# Patient Record
Sex: FEMALE
[De-identification: names, ages, dates, MRNs, and addresses within clinical notes are randomized per-mention and may not be internally consistent; named-entity substitution may affect disease eponyms.]

---

## 2017-05-14 ENCOUNTER — HOSPITAL ENCOUNTER (EMERGENCY)
Dept: HOSPITAL 5 - ED | Age: 47
LOS: 1 days | Discharge: HOME | End: 2017-05-15
Payer: MEDICARE

## 2017-05-14 VITALS — SYSTOLIC BLOOD PRESSURE: 116 MMHG | DIASTOLIC BLOOD PRESSURE: 79 MMHG

## 2017-05-14 DIAGNOSIS — K21.9: ICD-10-CM

## 2017-05-14 DIAGNOSIS — I10: ICD-10-CM

## 2017-05-14 DIAGNOSIS — F31.9: ICD-10-CM

## 2017-05-14 DIAGNOSIS — G43.909: Primary | ICD-10-CM

## 2017-05-14 DIAGNOSIS — J01.00: ICD-10-CM

## 2017-05-14 DIAGNOSIS — F17.200: ICD-10-CM

## 2017-05-14 DIAGNOSIS — Z88.8: ICD-10-CM

## 2017-05-14 LAB
ANION GAP SERPL CALC-SCNC: 19 MMOL/L
BASOPHILS NFR BLD AUTO: 1 % (ref 0–1.8)
BILIRUB UR QL STRIP: (no result)
BLOOD UR QL VISUAL: (no result)
BUN SERPL-MCNC: 12 MG/DL (ref 7–17)
BUN/CREAT SERPL: 17.14 %
CALCIUM SERPL-MCNC: 8.8 MG/DL (ref 8.4–10.2)
CHLORIDE SERPL-SCNC: 99.4 MMOL/L (ref 98–107)
CO2 SERPL-SCNC: 21 MMOL/L (ref 22–30)
EOSINOPHIL NFR BLD AUTO: 5.1 % (ref 0–4.3)
GLUCOSE SERPL-MCNC: 100 MG/DL (ref 65–100)
HCT VFR BLD CALC: 35.7 % (ref 30.3–42.9)
HGB BLD-MCNC: 12 GM/DL (ref 10.1–14.3)
KETONES UR STRIP-MCNC: (no result) MG/DL
LEUKOCYTE ESTERASE UR QL STRIP: (no result)
MCH RBC QN AUTO: 29 PG (ref 28–32)
MCHC RBC AUTO-ENTMCNC: 34 % (ref 30–34)
MCV RBC AUTO: 87 FL (ref 79–97)
NITRITE UR QL STRIP: (no result)
PH UR STRIP: 7 [PH] (ref 5–7)
PLATELET # BLD: 228 K/MM3 (ref 140–440)
POTASSIUM SERPL-SCNC: 4.5 MMOL/L (ref 3.6–5)
PROT UR STRIP-MCNC: (no result) MG/DL
RBC # BLD AUTO: 4.09 M/MM3 (ref 3.65–5.03)
RBC #/AREA URNS HPF: < 1 /HPF (ref 0–6)
SODIUM SERPL-SCNC: 135 MMOL/L (ref 137–145)
UROBILINOGEN UR-MCNC: < 2 MG/DL (ref ?–2)
WBC # BLD AUTO: 10.3 K/MM3 (ref 4.5–11)
WBC #/AREA URNS HPF: 1 /HPF (ref 0–6)

## 2017-05-14 PROCEDURE — 99284 EMERGENCY DEPT VISIT MOD MDM: CPT

## 2017-05-14 PROCEDURE — 81001 URINALYSIS AUTO W/SCOPE: CPT

## 2017-05-14 PROCEDURE — 96374 THER/PROPH/DIAG INJ IV PUSH: CPT

## 2017-05-14 PROCEDURE — 96375 TX/PRO/DX INJ NEW DRUG ADDON: CPT

## 2017-05-14 PROCEDURE — 80048 BASIC METABOLIC PNL TOTAL CA: CPT

## 2017-05-14 PROCEDURE — 96367 TX/PROPH/DG ADDL SEQ IV INF: CPT

## 2017-05-14 PROCEDURE — 81025 URINE PREGNANCY TEST: CPT

## 2017-05-14 PROCEDURE — 36415 COLL VENOUS BLD VENIPUNCTURE: CPT

## 2017-05-14 PROCEDURE — 96361 HYDRATE IV INFUSION ADD-ON: CPT

## 2017-05-14 PROCEDURE — 85025 COMPLETE CBC W/AUTO DIFF WBC: CPT

## 2017-05-14 PROCEDURE — 70450 CT HEAD/BRAIN W/O DYE: CPT

## 2017-05-14 NOTE — CAT SCAN REPORT
FINAL REPORT



PROCEDURE:  CT HEAD/BRAIN WO CON



TECHNIQUE:  Computerized tomography of the head was performed

without contrast material. 



HISTORY:  headache 



COMPARISON:  No prior studies are available for comparison.



FINDINGS:  

Skull and scalp: Normal.



Paranasal sinuses: An air-fluid level.



Ventricles and subarachnoid spaces: Normal.



Cerebrum: No evidence of hemorrhage, acute infarction or mass .



Cerebellum and brainstem: Is noted in the left maxillary sinus.



Vasculature: Normal.



Comments: None.



IMPRESSION:  

No acute intracranial abnormality



Acute left maxillary sinusitis

## 2017-05-14 NOTE — EMERGENCY DEPARTMENT REPORT
ED Headache HPI





- General


Chief Complaint: Headache


Stated Complaint: MIGRAINE/NAUSEA/EMESIS


Time Seen by Provider: 05/14/17 21:56





- History of Present Illness


Initial Comments: 





46-year-old female with history of hypertension and bipolar affective disorder 

currently under control presenting today because of headache.  Patient states 

that she has a history of migraines and has seen a neurologist but the last 

time was approximately 10 years ago when she also had an MRI that showed venous 

angioma, no intervention was recommended.  States that this time her headache 

is similar to her prior migraines and started yesterday night with occipital 

headache bilaterally that radiates to bifrontal area, associated with nausea 

and vomiting, no numbness/weakness, difficulty walking or talking.  This is 

associated with photophobia.  No fevers or chills.  Tried ibuprofen with mild 

relief.


Allergies/Adverse Reactions: 


Allergies





baclofen Allergy (Verified 05/14/17 19:01)


 Unknown


hydromorphone HCl [From Dilaudid] Allergy (Verified 05/14/17 19:01)


 Unknown


prochlorperazine [From Compazine] Allergy (Verified 05/14/17 19:01)


 Unknown


prochlorperazine edisylate [From Compazine] Allergy (Verified 05/14/17 19:01)


 Unknown


prochlorperazine maleate [From Compazine] Allergy (Verified 05/14/17 19:01)


 Unknown


sulfamethoxazole [From Bactrim] Allergy (Verified 05/14/17 19:01)


 Unknown


trimethoprim [From Bactrim] Allergy (Verified 05/14/17 19:01)


 Unknown








Home Medications: 


Ambulatory Orders





Butalb/Acetamin/Caff -40 [Fioricet] 1 tab PO Q6HR PRN #10 tab 05/15/17 


Ibuprofen [Motrin] 600 mg PO Q8H PRN #14 tablet 05/15/17 


Oxymetazoline 0.05% [Afrin] 2 spray NS BID #1 bottle 05/15/17 











ED Review of Systems


ROS: 


Stated complaint: MIGRAINE/NAUSEA/EMESIS


Other details as noted in HPI





Comment: All other systems reviewed and negative


Constitutional: denies: chills, fever


Respiratory: denies: cough


Cardiovascular: denies: chest pain


Gastrointestinal: nausea.  denies: abdominal pain


Skin: denies: rash


Neurological: headache.  denies: weakness, numbness, paresthesias, confusion, 

abnormal gait, vertigo


Psychiatric: denies: anxiety





ED Past Medical Hx





- Past Medical History


Hx Hypertension: Yes


Hx GERD: Yes


Hx Headaches / Migraines: Yes (MIGRAINES)


Hx Psychiatric Treatment: Yes (bipolar)


Additional medical history: gerd





- Surgical History


Additional Surgical History: RIGHT ACL X 4.  RIGHT WRIST.  LEFT KNEE.  LEFT 

ANKLE.  BACK SURGERY.  HYSTERECTOMY





- Social History


Smoking Status: Current Every Day Smoker


Substance Use Type: Prescribed





- Medications


Home Medications: 


 Home Medications











 Medication  Instructions  Recorded  Confirmed  Last Taken  Type


 


Butalb/Acetamin/Caff -40 1 tab PO Q6HR PRN #10 tab 05/15/17  Unknown Rx





[Fioricet]     


 


Ibuprofen [Motrin] 600 mg PO Q8H PRN #14 tablet 05/15/17  Unknown Rx


 


Oxymetazoline 0.05% [Afrin] 2 spray NS BID #1 bottle 05/15/17  Unknown Rx














ED Physical Exam





- General


Limitations: No Limitations


General appearance: alert





- Head


Head exam: Present: atraumatic





- Eye


Eye exam: Present: normal appearance, PERRL





- ENT


ENT exam: Present: normal exam





- Respiratory


Respiratory exam: Present: normal lung sounds bilaterally.  Absent: respiratory 

distress





- Cardiovascular


Cardiovascular Exam: Present: regular rate, normal rhythm





- GI/Abdominal


GI/Abdominal exam: Present: soft.  Absent: distended, tenderness





- Extremities Exam


Extremities exam: Present: normal inspection





- Neurological Exam


Neurological exam: Present: alert, oriented X3, CN II-XII intact, normal gait.  

Absent: motor sensory deficit





- Psychiatric


Psychiatric exam: Present: normal affect





- Skin


Skin exam: Present: intact





ED Course


 Vital Signs











  05/14/17 05/14/17 05/14/17





  19:04 20:57 20:59


 


Temperature 98.3 F 98 F 


 


Pulse Rate 86 70 


 


Respiratory 17 18 18





Rate   


 


Blood Pressure 130/71  


 


Blood Pressure  116/79 





[Right]   


 


O2 Sat by Pulse 98 98 98





Oximetry   














- Reevaluation(s)


Reevaluation #1: 





05/15/17 00:47


Labs are unremarkable, CT abdomen unremarkable, patient reassessed and symptoms 

have significantly improved.





ED Medical Decision Making





- Lab Data


Result diagrams: 


 05/14/17 21:28





 05/14/17 21:28





- Medical Decision Making





IV, labs preordered


IVF, fioricet, steroids, reglan/benadryl


CT head due to last imaging in remote past with venous angioma seen


CT shows acute maxillary sinusitis, does not show any evidence of acute 

intracranial pathology


Patient's symptoms significantly improved after medication, stable for 

discharge.


Critical care attestation.: 


If time is entered above; I have spent that time in minutes in the direct care 

of this critically ill patient, excluding procedure time.








ED Disposition


Clinical Impression: 


 Maxillary sinusitis, acute





Migraine


Qualifiers:


 Migraine type: unspecified Status migrainosus presence: without status 

migrainosus Intractability: not intractable Qualified Code(s): G43.909 - 

Migraine, unspecified, not intractable, without status migrainosus





Disposition: DISCHARGED TO HOME OR SELFCARE


Is pt being admited?: No


Does the pt Need Aspirin: No


Condition: Stable


Instructions:  Migraine Headache (ED)


Prescriptions: 


Butalb/Acetamin/Caff -40 [Fioricet] 1 tab PO Q6HR PRN #10 tab


 PRN Reason: Headache


Ibuprofen [Motrin] 600 mg PO Q8H PRN #14 tablet


 PRN Reason: Pain


Oxymetazoline 0.05% [Afrin] 2 spray NS BID #1 bottle


Referrals: 


PRIMARY CARE,MD [Primary Care Provider] - 3-5 Days

## 2017-05-26 ENCOUNTER — HOSPITAL ENCOUNTER (EMERGENCY)
Dept: HOSPITAL 5 - ED | Age: 47
LOS: 1 days | Discharge: HOME | End: 2017-05-27
Payer: MEDICARE

## 2017-05-26 VITALS — SYSTOLIC BLOOD PRESSURE: 120 MMHG | DIASTOLIC BLOOD PRESSURE: 79 MMHG

## 2017-05-26 DIAGNOSIS — F31.9: ICD-10-CM

## 2017-05-26 DIAGNOSIS — I10: ICD-10-CM

## 2017-05-26 DIAGNOSIS — K21.9: ICD-10-CM

## 2017-05-26 DIAGNOSIS — G43.909: ICD-10-CM

## 2017-05-26 DIAGNOSIS — F17.200: ICD-10-CM

## 2017-05-26 DIAGNOSIS — K62.5: Primary | ICD-10-CM

## 2017-05-26 LAB
ANION GAP SERPL CALC-SCNC: 22 MMOL/L
BASOPHILS NFR BLD AUTO: 0.8 % (ref 0–1.8)
BILIRUB UR QL STRIP: (no result)
BLOOD UR QL VISUAL: (no result)
BUN SERPL-MCNC: 12 MG/DL (ref 7–17)
BUN/CREAT SERPL: 15 %
CALCIUM SERPL-MCNC: 8.6 MG/DL (ref 8.4–10.2)
CHLORIDE SERPL-SCNC: 100.4 MMOL/L (ref 98–107)
CO2 SERPL-SCNC: 18 MMOL/L (ref 22–30)
EOSINOPHIL NFR BLD AUTO: 4.8 % (ref 0–4.3)
GLUCOSE SERPL-MCNC: 93 MG/DL (ref 65–100)
HCT VFR BLD CALC: 34.7 % (ref 30.3–42.9)
HGB BLD-MCNC: 11.5 GM/DL (ref 10.1–14.3)
KETONES UR STRIP-MCNC: (no result) MG/DL
LEUKOCYTE ESTERASE UR QL STRIP: (no result)
MCH RBC QN AUTO: 29 PG (ref 28–32)
MCHC RBC AUTO-ENTMCNC: 33 % (ref 30–34)
MCV RBC AUTO: 86 FL (ref 79–97)
MUCOUS THREADS #/AREA URNS HPF: (no result) /HPF
NITRITE UR QL STRIP: (no result)
PH UR STRIP: 6 [PH] (ref 5–7)
PLATELET # BLD: 254 K/MM3 (ref 140–440)
POTASSIUM SERPL-SCNC: 4.3 MMOL/L (ref 3.6–5)
PROT UR STRIP-MCNC: (no result) MG/DL
RBC # BLD AUTO: 4.04 M/MM3 (ref 3.65–5.03)
RBC #/AREA URNS HPF: 1 /HPF (ref 0–6)
SODIUM SERPL-SCNC: 136 MMOL/L (ref 137–145)
UROBILINOGEN UR-MCNC: < 2 MG/DL (ref ?–2)
WBC # BLD AUTO: 10 K/MM3 (ref 4.5–11)
WBC #/AREA URNS HPF: 1 /HPF (ref 0–6)

## 2017-05-26 PROCEDURE — 99284 EMERGENCY DEPT VISIT MOD MDM: CPT

## 2017-05-26 PROCEDURE — 36415 COLL VENOUS BLD VENIPUNCTURE: CPT

## 2017-05-26 PROCEDURE — 82962 GLUCOSE BLOOD TEST: CPT

## 2017-05-26 PROCEDURE — 80048 BASIC METABOLIC PNL TOTAL CA: CPT

## 2017-05-26 PROCEDURE — 81001 URINALYSIS AUTO W/SCOPE: CPT

## 2017-05-26 PROCEDURE — 96361 HYDRATE IV INFUSION ADD-ON: CPT

## 2017-05-26 PROCEDURE — 96374 THER/PROPH/DIAG INJ IV PUSH: CPT

## 2017-05-26 PROCEDURE — 85025 COMPLETE CBC W/AUTO DIFF WBC: CPT

## 2017-05-26 PROCEDURE — 74177 CT ABD & PELVIS W/CONTRAST: CPT

## 2017-05-26 PROCEDURE — 96375 TX/PRO/DX INJ NEW DRUG ADDON: CPT

## 2017-05-26 PROCEDURE — 96372 THER/PROPH/DIAG INJ SC/IM: CPT

## 2017-05-26 NOTE — EMERGENCY DEPARTMENT REPORT
ED N/V/D HPI





- General


Chief complaint: Nausea/Vomiting/Diarrhea


Stated complaint: RECTAL BLEEDING


Time Seen by Provider: 05/26/17 21:16


Source: patient


Mode of arrival: Ambulatory


Limitations: No Limitations





- History of Present Illness


Initial comments: 





46-year-old female presents to the emergency department complaining of diarrhea 

and rectal bleeding.  Patient states she began having watery diarrhea 4 days 

ago.  Yesterday she began having small amounts of bright red blood when she 

would wipe after a bowel movement.  Today she began having worsening bleeding.  

She reports today the blood was dark red.  She reports nausea, but no vomiting.

  She also reports a cramping lower abdominal pain that began earlier today.  

This pain does not radiate.  There has been no fever.  There are no other 

complaints.


MD complaint: nausea, diarrhea, abdominal pain


-: Gradual, days(s) (4)


Description of Diarrhea: water, bloody


Associated Abdominal Pain: Yes


Location: LLQ, RLQ


Radiation: none


Severity: mild


Pain Scale: 3


Quality: cramping


Consistency: constant


Improves with: none


Worsens with: none


Associated Symptoms: denies other symptoms





- Related Data


 Previous Rx's











 Medication  Instructions  Recorded  Last Taken  Type


 


Butalb/Acetamin/Caff -40 1 tab PO Q6HR PRN #10 tab 05/15/17 Unknown Rx





[Fioricet]    


 


Ibuprofen [Motrin] 600 mg PO Q8H PRN #14 tablet 05/15/17 Unknown Rx


 


Oxymetazoline 0.05% [Afrin] 2 spray NS BID #1 bottle 05/15/17 Unknown Rx


 


Diphenoxylate/Atropine [Lomotil] 1 tab PO Q4H PRN #14 tablet 05/27/17 Unknown Rx


 


HYDROcodone/APAP 5-325 [[place] 1 each PO Q6HR PRN #20 tablet 05/27/17 Unknown Rx





5/325]    











 Allergies











Allergy/AdvReac Type Severity Reaction Status Date / Time


 


baclofen Allergy  Unknown Verified 05/14/17 19:01


 


hydromorphone HCl Allergy  Unknown Verified 05/14/17 19:01





[From Dilaudid]     


 


prochlorperazine Allergy  Unknown Verified 05/14/17 19:01





[From Compazine]     


 


prochlorperazine edisylate Allergy  Unknown Verified 05/14/17 19:01





[From Compazine]     


 


prochlorperazine maleate Allergy  Unknown Verified 05/14/17 19:01





[From Compazine]     


 


sulfamethoxazole Allergy  Unknown Verified 05/14/17 19:01





[From Bactrim]     


 


trimethoprim [From Bactrim] Allergy  Unknown Verified 05/14/17 19:01














ED Review of Systems


ROS: 


Stated complaint: RECTAL BLEEDING


Other details as noted in HPI





Comment: All other systems reviewed and negative


Gastrointestinal: abdominal pain, nausea, diarrhea, hematochezia





ED Past Medical Hx





- Past Medical History


Previous Medical History?: Yes


Hx Hypertension: Yes


Hx GERD: Yes


Hx Headaches / Migraines: Yes (MIGRAINES)


Hx Psychiatric Treatment: Yes (bipolar)





- Surgical History


Past Surgical History?: Yes


Additional Surgical History: RIGHT ACL X 4.  RIGHT WRIST.  LEFT KNEE.  LEFT 

ANKLE.  BACK SURGERY.  HYSTERECTOMY





- Family History


Family history: no significant





- Social History


Smoking Status: Current Every Day Smoker


Substance Use Type: Prescribed





- Medications


Home Medications: 


 Home Medications











 Medication  Instructions  Recorded  Confirmed  Last Taken  Type


 


Butalb/Acetamin/Caff -40 1 tab PO Q6HR PRN #10 tab 05/15/17  Unknown Rx





[Fioricet]     


 


Ibuprofen [Motrin] 600 mg PO Q8H PRN #14 tablet 05/15/17  Unknown Rx


 


Oxymetazoline 0.05% [Afrin] 2 spray NS BID #1 bottle 05/15/17  Unknown Rx


 


Diphenoxylate/Atropine [Lomotil] 1 tab PO Q4H PRN #14 tablet 05/27/17  Unknown 

Rx


 


HYDROcodone/APAP 5-325 [[place] 1 each PO Q6HR PRN #20 tablet 05/27/17  Unknown Rx





5/325]     














ED Physical Exam





- General


Limitations: No Limitations


General appearance: alert, in no apparent distress





- Head


Head exam: Present: atraumatic, normocephalic





- Eye


Eye exam: Present: normal appearance, PERRL, EOMI





- ENT


ENT exam: Present: normal exam, normal orophraynx, mucous membranes moist





- Neck


Neck exam: Present: normal inspection, full ROM.  Absent: tenderness





- Respiratory


Respiratory exam: Present: normal lung sounds bilaterally.  Absent: respiratory 

distress





- Cardiovascular


Cardiovascular Exam: Present: regular rate, normal rhythm, normal heart sounds





- GI/Abdominal


GI/Abdominal exam: Present: soft, tenderness (mild tenderness to palpation to 

the left lower quadrant, right lower quadrant, and suprapubic regions.), normal 

bowel sounds.  Absent: distended, guarding, rebound





- Extremities Exam


Extremities exam: Present: normal inspection, full ROM.  Absent: tenderness





- Back Exam


Back exam: Present: normal inspection, full ROM.  Absent: tenderness





- Neurological Exam


Neurological exam: Present: alert, oriented X3.  Absent: motor sensory deficit





- Skin


Skin exam: Present: warm, dry, intact





ED Course


 Vital Signs











  05/26/17 05/26/17 05/26/17





  13:39 18:58 21:26


 


Temperature 99.5 F 98.3 F 


 


Pulse Rate 72 67 


 


Respiratory 19 18 20





Rate   


 


Blood Pressure 124/59 120/79 


 


O2 Sat by Pulse 100 100 97





Oximetry   














ED Medical Decision Making





- Lab Data


Result diagrams: 


 05/26/17 13:46





 05/26/17 13:46





- Radiology Data


Radiology results: report reviewed, image reviewed





CT abdomen and pelvis shows no acute intra-abdominal abnormality.





- Medical Decision Making





Lab and imaging results reviewed and discussed with the patient.  Patient 

reports feeling better with medication.  Patient will be discharged home at 

this time.





- Differential Diagnosis


diverticulitis, diverticulosis, hemorrhoids


Critical care attestation.: 


If time is entered above; I have spent that time in minutes in the direct care 

of this critically ill patient, excluding procedure time.








ED Disposition


Clinical Impression: 


 Rectal bleeding





Disposition: DISCHARGED TO HOME OR SELFCARE


Is pt being admited?: No


Condition: Stable


Instructions:  Rectal Bleeding (ED)


Prescriptions: 


Diphenoxylate/Atropine [Lomotil] 1 tab PO Q4H PRN #14 tablet


 PRN Reason: Diarrhea


HYDROcodone/APAP 5-325 [Norco 5/325] 1 each PO Q6HR PRN #20 tablet


 PRN Reason: Pain


Referrals: 


DR NEYDA [Other] - 3-5 Days


Time of Disposition: 01:33

## 2018-03-09 ENCOUNTER — HOSPITAL ENCOUNTER (EMERGENCY)
Dept: HOSPITAL 5 - ED | Age: 48
Discharge: HOME | End: 2018-03-09
Payer: MEDICARE

## 2018-03-09 VITALS — DIASTOLIC BLOOD PRESSURE: 76 MMHG | SYSTOLIC BLOOD PRESSURE: 118 MMHG

## 2018-03-09 DIAGNOSIS — K21.9: ICD-10-CM

## 2018-03-09 DIAGNOSIS — Z88.8: ICD-10-CM

## 2018-03-09 DIAGNOSIS — Z90.710: ICD-10-CM

## 2018-03-09 DIAGNOSIS — F31.9: ICD-10-CM

## 2018-03-09 DIAGNOSIS — I10: ICD-10-CM

## 2018-03-09 DIAGNOSIS — Z76.5: ICD-10-CM

## 2018-03-09 DIAGNOSIS — G43.919: Primary | ICD-10-CM

## 2018-03-09 DIAGNOSIS — Z88.1: ICD-10-CM

## 2018-03-09 DIAGNOSIS — F17.200: ICD-10-CM

## 2018-03-09 PROCEDURE — 96374 THER/PROPH/DIAG INJ IV PUSH: CPT

## 2018-03-09 PROCEDURE — 96375 TX/PRO/DX INJ NEW DRUG ADDON: CPT

## 2018-03-09 PROCEDURE — 99282 EMERGENCY DEPT VISIT SF MDM: CPT

## 2018-03-09 NOTE — EMERGENCY DEPARTMENT REPORT
ED Headache HPI





- General


Chief Complaint: Headache


Stated Complaint: MIGRAINE


Time Seen by Provider: 03/09/18 21:27





- History of Present Illness


Initial Comments: 





47-year-old  female comes in complaint of headache and nausea.  

Patient reports that she has a history of migraines.  Patient reports she's 

taken her Imitrex and a 5 mg Percocet without much relief.  Patient admits to 

nausea and vomited 2.  She missed a photophobia and loud noise X her head hurt 

worse.  Patient does elicit she has an appointment on March 27 with her 

neurologist.  Patient reports she has a past medical history of migraines and 

hypertension.  She has multiple allergies to meds.


Timing/Duration: 4-6 hours


Quality: moderate


Recent Head Trauma: no recent headache/trauma


Modifying Factors: improves with: exposure to light


Associated Symptoms: nausea/vomiting


Allergies/Adverse Reactions: 


Allergies





baclofen Allergy (Verified 05/14/17 19:01)


 Unknown


hydromorphone HCl [From Dilaudid] Allergy (Verified 05/14/17 19:01)


 Unknown


prochlorperazine [From Compazine] Allergy (Verified 05/14/17 19:01)


 Unknown


prochlorperazine edisylate [From Compazine] Allergy (Verified 05/14/17 19:01)


 Unknown


prochlorperazine maleate [From Compazine] Allergy (Verified 05/14/17 19:01)


 Unknown


sulfamethoxazole [From Bactrim] Allergy (Verified 05/14/17 19:01)


 Unknown


trimethoprim [From Bactrim] Allergy (Verified 05/14/17 19:01)


 Unknown








Home Medications: 


Ambulatory Orders





Butalb/Acetamin/Caff -40 [Fioricet] 1 tab PO Q6HR PRN #10 tab 05/15/17 


Ibuprofen [Motrin] 600 mg PO Q8H PRN #14 tablet 05/15/17 


Oxymetazoline 0.05% [Afrin] 2 spray NS BID #1 bottle 05/15/17 


Diphenoxylate/Atropine [Lomotil] 1 tab PO Q4H PRN #14 tablet 05/27/17 


HYDROcodone/APAP 5-325 [[place] 5/325] 1 each PO Q6HR PRN #20 tablet 05/27/17 











ED Review of Systems


ROS: 


Stated complaint: MIGRAINE


Other details as noted in HPI





Constitutional: denies: chills, fever


Eyes: denies: eye pain, eye discharge, vision change


ENT: denies: ear pain, throat pain


Respiratory: denies: cough, shortness of breath, wheezing


Cardiovascular: denies: chest pain, palpitations


Endocrine: no symptoms reported


Gastrointestinal: nausea, vomiting (X2)


Genitourinary: denies: urgency, dysuria, discharge


Musculoskeletal: denies: back pain, joint swelling, arthralgia


Skin: denies: rash, lesions


Neurological: headache.  denies: weakness, numbness, confusion, abnormal gait


Psychiatric: denies: anxiety, depression


Hematological/Lymphatic: denies: easy bleeding, easy bruising





ED Past Medical Hx





- Past Medical History


Hx Hypertension: Yes


Hx GERD: Yes


Hx Headaches / Migraines: Yes (MIGRAINES)


Hx Psychiatric Treatment: Yes (bipolar)





- Surgical History


Additional Surgical History: RIGHT ACL X 4.  RIGHT WRIST.  LEFT KNEE.  LEFT 

ANKLE.  BACK SURGERY.  HYSTERECTOMY





- Social History


Smoking Status: Current Every Day Smoker


Substance Use Type: None





- Medications


Home Medications: 


 Home Medications











 Medication  Instructions  Recorded  Confirmed  Last Taken  Type


 


Butalb/Acetamin/Caff -40 1 tab PO Q6HR PRN #10 tab 05/15/17  Unknown Rx





[Fioricet]     


 


Ibuprofen [Motrin] 600 mg PO Q8H PRN #14 tablet 05/15/17  Unknown Rx


 


Oxymetazoline 0.05% [Afrin] 2 spray NS BID #1 bottle 05/15/17  Unknown Rx


 


Diphenoxylate/Atropine [Lomotil] 1 tab PO Q4H PRN #14 tablet 05/27/17  Unknown 

Rx


 


HYDROcodone/APAP 5-325 [[place] 1 each PO Q6HR PRN #20 tablet 05/27/17  Unknown Rx





5/325]     














ED Physical Exam





- General


Limitations: No Limitations


General appearance: alert, in no apparent distress





- Head


Head exam: Present: atraumatic, normocephalic





- Eye


Eye exam: Present: normal appearance





- ENT


ENT exam: Present: mucous membranes moist





- Neck


Neck exam: Present: normal inspection





- Respiratory


Respiratory exam: Present: normal lung sounds bilaterally.  Absent: respiratory 

distress





- Cardiovascular


Cardiovascular Exam: Present: regular rate, normal rhythm.  Absent: systolic 

murmur, diastolic murmur, rubs, gallop





- GI/Abdominal


GI/Abdominal exam: Present: soft, normal bowel sounds





- Extremities Exam


Extremities exam: Present: normal inspection





- Back Exam


Back exam: Present: normal inspection





- Neurological Exam


Neurological exam: Present: alert, oriented X3





- Expanded Neurological Exam


  ** Expanded


Cranial nerves: EOM's Intact: Normal, Gag Reflex: Normal, Tongue Deviation: 

Normal, Nystagmus: Normal, Facial Sensation: Normal, Facial Palsy with Forehead 

Movement: Normal, Facial Palsy without Forehead Movement: Normal


Cerebellar function: Finger to Nose: Normal, Heel to Shin: Normal, Romberg: 

Normal


Upper motor neuron: [place] Neglect: Normal, Pronator Drift: Normal


Sensory exam: Upper Extremity Light Touch: Normal, Upper Extremity Pin Prick: 

Normal, Upper Extremity Temperature: Normal, UE 2 Point Discrimination: Normal, 

Lower Extremity Light Touch: Normal, Lower Extremity Pin Prick: Normal, Lower 

Extremity Temperature: Normal, LE 2 Point Discrimination: Normal


Motor strength exam: RUE: 4, LUE: 4, RLE: 4, LLE: 4


Best Eye Response ([place]): (4) open spontaneously


Best Motor Response ([place]): (6) obeys commands


Best Verbal Response ([place]): (5) oriented


[place] Total: 15





- Psychiatric


Psychiatric exam: Present: normal affect, normal mood





- Skin


Skin exam: Present: warm, dry, intact, normal color.  Absent: rash





ED Course


 Vital Signs











  03/09/18 03/09/18 03/09/18





  18:42 22:03 22:33


 


Temperature 98.6 F  


 


Pulse Rate 68  


 


Respiratory  16 16





Rate   


 


Blood Pressure 115/77  


 


O2 Sat by Pulse 100  





Oximetry   














ED Medical Decision Making





- Medical Decision Making





Patient has been evaluated by this provider fast track.  I discussed the 

patient we'll place an IV will give her Reglan Benadryl Toradol to help with 

her headaches.  Discussed the patient she needs to keep her appointment with 

her neurologist as scheduled for March 27.  Patient verbalized understanding.








Patient has had 2 prescriptions for narcotic pain medication on March 2 and 

March 4.  In February she's had 4 different prescriptions for narcotics.  She 

has multiple areas where she goes and gets her narcotics prescriptions such [facility] 

[facility]





I discussed the patient can give her Tylenol for her headache.  She's had 

Toradol Benadryl and Reglan IV.





Critical care attestation.: 


If time is entered above; I have spent that time in minutes in the direct care 

of this critically ill patient, excluding procedure time.








ED Disposition


Clinical Impression: 


 Drug-seeking behavior





Migraine


Qualifiers:


 Migraine type: unspecified Status migrainosus presence: without status 

migrainosus Intractability: intractable Qualified Code(s): G43.919 - Migraine, 

unspecified, intractable, without status migrainosus





Disposition: DC-01 TO HOME OR SELFCARE


Is pt being admited?: No


Does the pt Need Aspirin: No


Condition: Stable


Instructions:  Migraine Headache (ED)


Additional Instructions: 


Please continue taking her Imitrex as prescribed by her neurologist.  Please 

keep your appointment for March 27 with her neurologist.


Referrals: 


TOBI FAYE MD [Primary Care Provider] - 3-5 Days


Forms:  Work/School Release Form(ED)

## 2018-03-14 ENCOUNTER — HOSPITAL ENCOUNTER (EMERGENCY)
Dept: HOSPITAL 5 - ED | Age: 48
LOS: 1 days | Discharge: HOME | End: 2018-03-15
Payer: MEDICARE

## 2018-03-14 DIAGNOSIS — G43.919: Primary | ICD-10-CM

## 2018-03-14 DIAGNOSIS — F31.9: ICD-10-CM

## 2018-03-14 DIAGNOSIS — I10: ICD-10-CM

## 2018-03-14 DIAGNOSIS — K21.9: ICD-10-CM

## 2018-03-14 DIAGNOSIS — F17.200: ICD-10-CM

## 2018-03-14 PROCEDURE — 96374 THER/PROPH/DIAG INJ IV PUSH: CPT

## 2018-03-14 PROCEDURE — 99282 EMERGENCY DEPT VISIT SF MDM: CPT

## 2018-03-14 PROCEDURE — 96375 TX/PRO/DX INJ NEW DRUG ADDON: CPT

## 2018-03-15 VITALS — SYSTOLIC BLOOD PRESSURE: 137 MMHG | DIASTOLIC BLOOD PRESSURE: 85 MMHG

## 2018-03-15 NOTE — EMERGENCY DEPARTMENT REPORT
ED Headache HPI





- General


Chief Complaint: Headache


Stated Complaint: MIGRAINE; N/V


Time Seen by Provider: 03/15/18 05:17


Source: patient





- History of Present Illness


Initial Comments: 





47-year-old  female comes in reporting she has migraine with nausea 

and vomiting that started this morning.  Patient reports that she last vomited 

2 hours ago she took her Imitrex and ibuprofen and Tylenol 3 did not help.  She 

does not elicit this is a worse headache of her life.  Complains of light 

sensitivity she reports is her typical migraine.  She reports nausea and 

vomiting.  Patient was last seen here on the ninth with me for migraines as 

well.


Timing/Duration: 4-6 hours


Quality: moderate, throbbing


Recent Head Trauma: no recent headache/trauma


Associated Symptoms: nausea/vomiting.  denies: fatigue, fever/chills, loss of 

consciousness, nasal congestion, nasal drainage


Allergies/Adverse Reactions: 


Allergies





baclofen Allergy (Verified 05/14/17 19:01)


 Unknown


hydromorphone HCl [From Dilaudid] Allergy (Verified 05/14/17 19:01)


 Unknown


prochlorperazine [From Compazine] Allergy (Verified 05/14/17 19:01)


 Unknown


prochlorperazine edisylate [From Compazine] Allergy (Verified 05/14/17 19:01)


 Unknown


prochlorperazine maleate [From Compazine] Allergy (Verified 05/14/17 19:01)


 Unknown


sulfamethoxazole [From Bactrim] Allergy (Verified 05/14/17 19:01)


 Unknown


trimethoprim [From Bactrim] Allergy (Verified 05/14/17 19:01)


 Unknown








Home Medications: 


Ambulatory Orders





Butalb/Acetamin/Caff -40 [Fioricet] 1 tab PO Q6HR PRN #10 tab 05/15/17 


Ibuprofen [Motrin] 600 mg PO Q8H PRN #14 tablet 05/15/17 


Oxymetazoline 0.05% [Afrin] 2 spray NS BID #1 bottle 05/15/17 


Diphenoxylate/Atropine [Lomotil] 1 tab PO Q4H PRN #14 tablet 05/27/17 


HYDROcodone/APAP 5-325 [[place] 5/325] 1 each PO Q6HR PRN #20 tablet 05/27/17 


Ibuprofen 800 mg PO Q8H PRN #30 tablet 03/15/18 











ED Review of Systems


ROS: 


Stated complaint: MIGRAINE; N/V


Other details as noted in HPI





Constitutional: denies: chills, fever


Eyes: denies: eye pain, eye discharge, vision change


ENT: denies: ear pain, throat pain


Respiratory: denies: cough, shortness of breath, wheezing


Cardiovascular: denies: chest pain, palpitations


Endocrine: no symptoms reported


Gastrointestinal: nausea, vomiting.  denies: abdominal pain, diarrhea


Genitourinary: denies: urgency, dysuria, discharge


Musculoskeletal: denies: back pain, joint swelling, arthralgia


Skin: denies: rash, lesions


Neurological: headache.  denies: weakness, paresthesias


Psychiatric: denies: anxiety, depression


Hematological/Lymphatic: denies: easy bleeding, easy bruising





ED Past Medical Hx





- Past Medical History


Previous Medical History?: Yes


Hx Hypertension: Yes


Hx GERD: Yes


Hx Headaches / Migraines: Yes (MIGRAINES)


Hx Psychiatric Treatment: Yes (bipolar)





- Surgical History


Past Surgical History?: Yes


Additional Surgical History: RIGHT ACL X 4.  RIGHT WRIST.  LEFT KNEE.  LEFT 

ANKLE.  BACK SURGERY.  HYSTERECTOMY





- Social History


Smoking Status: Current Every Day Smoker


Substance Use Type: None





- Medications


Home Medications: 


 Home Medications











 Medication  Instructions  Recorded  Confirmed  Last Taken  Type


 


Butalb/Acetamin/Caff -40 1 tab PO Q6HR PRN #10 tab 05/15/17  Unknown Rx





[Fioricet]     


 


Ibuprofen [Motrin] 600 mg PO Q8H PRN #14 tablet 05/15/17  Unknown Rx


 


Oxymetazoline 0.05% [Afrin] 2 spray NS BID #1 bottle 05/15/17  Unknown Rx


 


Diphenoxylate/Atropine [Lomotil] 1 tab PO Q4H PRN #14 tablet 05/27/17  Unknown 

Rx


 


HYDROcodone/APAP 5-325 [[place] 1 each PO Q6HR PRN #20 tablet 05/27/17  Unknown Rx





5/325]     


 


Ibuprofen 800 mg PO Q8H PRN #30 tablet 03/15/18  Unknown Rx














ED Physical Exam





- General


Limitations: No Limitations


General appearance: alert, in no apparent distress





- Head


Head exam: Present: atraumatic, normocephalic





- Eye


Eye exam: Present: EOMI, other (pupils are enlarged and slow to constrict)





- ENT


ENT exam: Present: mucous membranes moist





- Neck


Neck exam: Present: normal inspection





- Respiratory


Respiratory exam: Present: normal lung sounds bilaterally.  Absent: respiratory 

distress





- Cardiovascular


Cardiovascular Exam: Present: regular rate, normal rhythm.  Absent: systolic 

murmur, diastolic murmur, rubs, gallop





- GI/Abdominal


GI/Abdominal exam: Present: soft, normal bowel sounds





- Extremities Exam


Extremities exam: Present: normal inspection





- Back Exam


Back exam: Present: normal inspection





- Neurological Exam


Neurological exam: Present: alert, oriented X3





- Psychiatric


Psychiatric exam: Present: normal affect, normal mood





- Skin


Skin exam: Present: warm, dry, intact, normal color.  Absent: rash





ED Course


 Vital Signs











  03/14/18





  23:50


 


Temperature 98.5 F


 


Pulse Rate 66


 


Respiratory 18





Rate 


 


Blood Pressure 112/67


 


O2 Sat by Pulse 98





Oximetry 














ED Medical Decision Making





- Medical Decision Making





Patient has been evaluated by this provider fast track.  Patient will be given 

Toradol and Benadryl and Reglan for headache.  She'll be discharged for only 

Tylenol and/or ibuprofen.  Discussed the patient she has multiple narcotics 

[facility] [place] [facility]  She has had 56 prescriptions for narcotics in 

the last 2 years.  Patient had 14 prescribers.  She is using 3 different 

pharmacies.


Critical care attestation.: 


If time is entered above; I have spent that time in minutes in the direct care 

of this critically ill patient, excluding procedure time.








ED Disposition


Clinical Impression: 


 Drug-seeking behavior





Migraine


Qualifiers:


 Migraine type: unspecified Status migrainosus presence: without status 

migrainosus Intractability: intractable Qualified Code(s): G43.919 - Migraine, 

unspecified, intractable, without status migrainosus





Disposition: DC-01 TO HOME OR SELFCARE


Is pt being admited?: No


Does the pt Need Aspirin: No


Condition: Stable


Instructions:  Migraine Headache (ED)


Additional Instructions: 


Please take ibuprofen for your pain.  Please follow-up which her primary care 

provider as well as her neurologist.


Prescriptions: 


Ibuprofen 800 mg PO Q8H PRN #30 tablet


 PRN Reason: Pain


Referrals: 


LILY MCDOWELL MD [Primary Care Provider] - 3-5 Days

## 2018-03-16 ENCOUNTER — HOSPITAL ENCOUNTER (EMERGENCY)
Dept: HOSPITAL 5 - ED | Age: 48
LOS: 1 days | Discharge: HOME | End: 2018-03-17
Payer: MEDICARE

## 2018-03-16 VITALS — SYSTOLIC BLOOD PRESSURE: 104 MMHG | DIASTOLIC BLOOD PRESSURE: 62 MMHG

## 2018-03-16 DIAGNOSIS — K21.9: ICD-10-CM

## 2018-03-16 DIAGNOSIS — T78.1XXA: Primary | ICD-10-CM

## 2018-03-16 DIAGNOSIS — F17.200: ICD-10-CM

## 2018-03-16 DIAGNOSIS — F31.9: ICD-10-CM

## 2018-03-16 DIAGNOSIS — Z90.710: ICD-10-CM

## 2018-03-16 DIAGNOSIS — I10: ICD-10-CM

## 2018-03-16 DIAGNOSIS — Z91.018: ICD-10-CM

## 2018-03-16 PROCEDURE — 96372 THER/PROPH/DIAG INJ SC/IM: CPT

## 2018-03-16 PROCEDURE — 99282 EMERGENCY DEPT VISIT SF MDM: CPT

## 2018-03-17 NOTE — EMERGENCY DEPARTMENT REPORT
HPI





- General


Chief Complaint: Allergic Reaction


Time Seen by Provider: 03/17/18 03:14





- HPI


HPI: 





47-year-old  female comes in from anger stated about noon applied she 

consumed mushrooms at within mixed vegetables and a half an hour later she 

started having body and throat itching.  She reports she took a Benadryl one by 

mouth and itching continued so she came to here to be evaluated.





ED Past Medical Hx





- Past Medical History


Hx Hypertension: Yes


Hx GERD: Yes


Hx Headaches / Migraines: Yes (MIGRAINES)


Hx Psychiatric Treatment: Yes (bipolar)





- Surgical History


Additional Surgical History: RIGHT ACL X 4, hysterectomy.  RIGHT WRIST.  LEFT 

KNEE.  LEFT ANKLE.  BACK SURGERY.  HYSTERECTOMY





- Social History


Smoking Status: Current Every Day Smoker


Substance Use Type: None





- Medications


Home Medications: 


 Home Medications











 Medication  Instructions  Recorded  Confirmed  Last Taken  Type


 


Butalb/Acetamin/Caff -40 1 tab PO Q6HR PRN #10 tab 05/15/17  Unknown Rx





[Fioricet]     


 


Ibuprofen [Motrin] 600 mg PO Q8H PRN #14 tablet 05/15/17  Unknown Rx


 


Oxymetazoline 0.05% [Afrin] 2 spray NS BID #1 bottle 05/15/17  Unknown Rx


 


Diphenoxylate/Atropine [Lomotil] 1 tab PO Q4H PRN #14 tablet 05/27/17  Unknown 

Rx


 


HYDROcodone/APAP 5-325 [[place] 1 each PO Q6HR PRN #20 tablet 05/27/17  Unknown Rx





5/325]     


 


Ibuprofen 800 mg PO Q8H PRN #30 tablet 03/15/18  Unknown Rx














ED Review of Systems


ROS: 


Stated complaint: ALLERGIC REACTION


Other details as noted in HPI





Constitutional: denies: chills, fever


Eyes: denies: eye pain, eye discharge, vision change


ENT: denies: ear pain, throat pain


Respiratory: denies: cough, shortness of breath, wheezing


Cardiovascular: denies: chest pain, palpitations


Endocrine: no symptoms reported


Gastrointestinal: denies: abdominal pain, nausea, diarrhea


Genitourinary: denies: urgency, dysuria, discharge


Musculoskeletal: denies: back pain, joint swelling, arthralgia


Skin: pruritus.  denies: rash, lesions


Neurological: denies: headache, weakness, paresthesias


Psychiatric: denies: anxiety, depression


Hematological/Lymphatic: denies: easy bleeding, easy bruising





Physical Exam





- Physical Exam


Vital Signs: 


 Vital Signs











  03/16/18





  23:14


 


Temperature 98 F


 


Pulse Rate 76


 


Respiratory 18





Rate 


 


Blood Pressure 104/62


 


O2 Sat by Pulse 98





Oximetry 











General: 





Patient is alert and oriented 3.  No acute distress talking in full sentences 

following commands.


Physical Exam: 





GENERAL APPEARANCE: Well developed, well nourished, in no acute distress.


SKIN: Inspection of the skin reveals no rashes, ulcerations or petechiae.


HEENT: The sclerae were anicteric and conjunctivae were pink and moist. 

Extraocular movements were intact and pupils were equal, round, and reactive to 

light with normal accommodation. External inspection of the ears and nose 

showed no scars, lesions, or masses. Lips, teeth, and gums showed normal 

mucosa. The oral mucosa, hard and soft palate, tongue and posterior pharynx 

were normal.  No swelling to the mouth and tongue or throat.


NECK: Supple and symmetric. 


CHEST: Normal AP diameter and normal contour with  kyphoscoliosis.


LUNGS: Auscultation of the lungs revealed normal breath sounds without any 

other adventitious sounds or rubs.


CARDIOVASCULAR: There was a regular rate and rhythm without any murmurs, gallops

, rubs. T


prostate was normal size without any nodules appreciated (men only).


LYMPH NODES: No lymphadenopathy was appreciated in the neck, axillae or groin.


MUSCULOSKELETAL: Gait was normal. There was no tenderness or effusions noted. 

Muscle strength and tone were normal.


EXTREMITIES: No cyanosis, clubbing or edema.


NEUROLOGIC: Alert and oriented x 3. Normal affect. Gait was normal. . Sensation 

to touch was normal. 








ED Course


 Vital Signs











  03/16/18





  23:14


 


Temperature 98 F


 


Pulse Rate 76


 


Respiratory 18





Rate 


 


Blood Pressure 104/62


 


O2 Sat by Pulse 98





Oximetry 














ED Medical Decision Making





- Medical Decision Making





Patient has been evaluated by this provider in fast track.  This is my third 

time I have seen this patient in the last week.  Patient was given 50 mg of 

Benadryl 20 mg of Pepcid and 125 mg of Solu-Medrol.  Patient reports to me 

during my interview that she feels much better now.  We will discharge patient 

at this time she is in a stable.


Critical care attestation.: 


If time is entered above; I have spent that time in minutes in the direct care 

of this critically ill patient, excluding procedure time.








ED Disposition


Clinical Impression: 


Allergic reaction


Qualifiers:


 Encounter type: initial encounter Qualified Code(s): T78.40XA - Allergy, 

unspecified, initial encounter





Disposition: DC-01 TO HOME OR SELFCARE


Is pt being admited?: No


Does the pt Need Aspirin: No


Condition: Stable


Instructions:  Food Allergy (ED)


Additional Instructions: 


Please avoid offending agent such as mushrooms.  Follow-up with her primary 

care provider.


Referrals: 


PRIMARY CARE,MD [Primary Care Provider] - 3-5 Days

## 2018-03-18 ENCOUNTER — HOSPITAL ENCOUNTER (EMERGENCY)
Dept: HOSPITAL 5 - ED | Age: 48
LOS: 1 days | Discharge: PSYCHIATRIC OR SUBSTANCE USE FACILITY | End: 2018-03-19
Payer: MEDICARE

## 2018-03-18 DIAGNOSIS — F31.9: Primary | ICD-10-CM

## 2018-03-18 DIAGNOSIS — K21.9: ICD-10-CM

## 2018-03-18 DIAGNOSIS — Z90.710: ICD-10-CM

## 2018-03-18 DIAGNOSIS — Z88.8: ICD-10-CM

## 2018-03-18 DIAGNOSIS — G43.909: ICD-10-CM

## 2018-03-18 DIAGNOSIS — Z79.899: ICD-10-CM

## 2018-03-18 DIAGNOSIS — I10: ICD-10-CM

## 2018-03-18 DIAGNOSIS — F17.200: ICD-10-CM

## 2018-03-18 LAB
BACTERIA #/AREA URNS HPF: (no result) /HPF
BASOPHILS # (AUTO): 0.1 K/MM3 (ref 0–0.1)
BASOPHILS NFR BLD AUTO: 0.7 % (ref 0–1.8)
BENZODIAZEPINES SCREEN,URINE: (no result)
BILIRUB UR QL STRIP: (no result)
BLOOD UR QL VISUAL: (no result)
BUN SERPL-MCNC: 15 MG/DL (ref 7–17)
BUN/CREAT SERPL: 19 %
CALCIUM SERPL-MCNC: 8.2 MG/DL (ref 8.4–10.2)
EOSINOPHIL # BLD AUTO: 0.3 K/MM3 (ref 0–0.4)
EOSINOPHIL NFR BLD AUTO: 2.2 % (ref 0–4.3)
HCT VFR BLD CALC: 32.9 % (ref 30.3–42.9)
HEMOLYSIS INDEX: 72
HGB BLD-MCNC: 10.7 GM/DL (ref 10.1–14.3)
LYMPHOCYTES # BLD AUTO: 2.8 K/MM3 (ref 1.2–5.4)
LYMPHOCYTES NFR BLD AUTO: 22 % (ref 13.4–35)
MCH RBC QN AUTO: 29 PG (ref 28–32)
MCHC RBC AUTO-ENTMCNC: 32 % (ref 30–34)
MCV RBC AUTO: 91 FL (ref 79–97)
METHADONE SCREEN,URINE: (no result)
MONOCYTES # (AUTO): 0.9 K/MM3 (ref 0–0.8)
MONOCYTES % (AUTO): 7.2 % (ref 0–7.3)
MUCOUS THREADS #/AREA URNS HPF: (no result) /HPF
OPIATE SCREEN,URINE: (no result)
PH UR STRIP: 5 [PH] (ref 5–7)
PLATELET # BLD: 291 K/MM3 (ref 140–440)
PROT UR STRIP-MCNC: (no result) MG/DL
RBC # BLD AUTO: 3.62 M/MM3 (ref 3.65–5.03)
RBC #/AREA URNS HPF: 6 /HPF (ref 0–6)
UROBILINOGEN UR-MCNC: < 2 MG/DL (ref ?–2)
WBC #/AREA URNS HPF: 18 /HPF (ref 0–6)

## 2018-03-18 PROCEDURE — 85025 COMPLETE CBC W/AUTO DIFF WBC: CPT

## 2018-03-18 PROCEDURE — 81001 URINALYSIS AUTO W/SCOPE: CPT

## 2018-03-18 PROCEDURE — 99285 EMERGENCY DEPT VISIT HI MDM: CPT

## 2018-03-18 PROCEDURE — G0480 DRUG TEST DEF 1-7 CLASSES: HCPCS

## 2018-03-18 PROCEDURE — 80048 BASIC METABOLIC PNL TOTAL CA: CPT

## 2018-03-18 PROCEDURE — 80320 DRUG SCREEN QUANTALCOHOLS: CPT

## 2018-03-18 PROCEDURE — 36415 COLL VENOUS BLD VENIPUNCTURE: CPT

## 2018-03-18 PROCEDURE — 80307 DRUG TEST PRSMV CHEM ANLYZR: CPT

## 2018-03-18 NOTE — EMERGENCY DEPARTMENT REPORT
ED Psych HPI





- General


Chief Complaint: Psych


Stated Complaint: SUICIDAL THOUGHTS


Time Seen by Provider: 03/18/18 20:49


Source: patient


Mode of arrival: Ambulatory





- History of Present Illness


Initial Comments: 





Patient is 47 years old female history of hypertension, bipolar brought to the 

ER by her  for suicidal thoughts.  Patient told her  that she 

thinking about killing herself by shooting herself.  Patient had history of 

suicidal thoughts before.  She denied SI, HI.  She denied any auditory or 

visual hallucination.





- Related Data


 Previous Rx's











 Medication  Instructions  Recorded  Last Taken  Type


 


Butalb/Acetamin/Caff -40 1 tab PO Q6HR PRN #10 tab 05/15/17 Unknown Rx





[Fioricet]    


 


Ibuprofen [Motrin] 600 mg PO Q8H PRN #14 tablet 05/15/17 Unknown Rx


 


Oxymetazoline 0.05% [Afrin] 2 spray NS BID #1 bottle 05/15/17 Unknown Rx


 


Diphenoxylate/Atropine [Lomotil] 1 tab PO Q4H PRN #14 tablet 05/27/17 Unknown Rx


 


HYDROcodone/APAP 5-325 [[place] 1 each PO Q6HR PRN #20 tablet 05/27/17 Unknown Rx





5/325]    


 


Ibuprofen 800 mg PO Q8H PRN #30 tablet 03/15/18 Unknown Rx











 Allergies











Allergy/AdvReac Type Severity Reaction Status Date / Time


 


baclofen Allergy  Unknown Verified 05/14/17 19:01


 


hydromorphone HCl Allergy  Unknown Verified 05/14/17 19:01





[From Dilaudid]     


 


prochlorperazine Allergy  Unknown Verified 05/14/17 19:01





[From Compazine]     


 


prochlorperazine edisylate Allergy  Unknown Verified 05/14/17 19:01





[From Compazine]     


 


prochlorperazine maleate Allergy  Unknown Verified 05/14/17 19:01





[From Compazine]     


 


sulfamethoxazole Allergy  Unknown Verified 05/14/17 19:01





[From Bactrim]     


 


trimethoprim [From Bactrim] Allergy  Unknown Verified 05/14/17 19:01














ED Review of Systems


ROS: 


Stated complaint: SUICIDAL THOUGHTS


Other details as noted in HPI





Comment: All other systems reviewed and negative


Constitutional: denies: chills, fever


Respiratory: denies: cough, orthopnea, shortness of breath, SOB with exertion


Cardiovascular: denies: chest pain, palpitations


Gastrointestinal: denies: abdominal pain, nausea, vomiting


Genitourinary: denies: urgency, frequency, hematuria


Musculoskeletal: denies: back pain


Neurological: denies: headache, weakness, numbness, paresthesias


Psychiatric: depression, suicidal thoughts





ED Past Medical Hx





- Past Medical History


Previous Medical History?: Yes


Hx Hypertension: Yes


Hx GERD: Yes


Hx Headaches / Migraines: Yes (MIGRAINES)


Hx Psychiatric Treatment: Yes (bipolar, Suicidal attempt)





- Surgical History


Past Surgical History?: Yes


Additional Surgical History: RIGHT ACL X 4, hysterectomy.  RIGHT WRIST.  LEFT 

KNEE.  LEFT ANKLE.  BACK SURGERY.  HYSTERECTOMY





- Social History


Smoking Status: Current Every Day Smoker





- Medications


Home Medications: 


 Home Medications











 Medication  Instructions  Recorded  Confirmed  Last Taken  Type


 


Butalb/Acetamin/Caff -40 1 tab PO Q6HR PRN #10 tab 05/15/17  Unknown Rx





[Fioricet]     


 


Ibuprofen [Motrin] 600 mg PO Q8H PRN #14 tablet 05/15/17  Unknown Rx


 


Oxymetazoline 0.05% [Afrin] 2 spray NS BID #1 bottle 05/15/17  Unknown Rx


 


Diphenoxylate/Atropine [Lomotil] 1 tab PO Q4H PRN #14 tablet 05/27/17  Unknown 

Rx


 


HYDROcodone/APAP 5-325 [[place] 1 each PO Q6HR PRN #20 tablet 05/27/17  Unknown Rx





5/325]     


 


Ibuprofen 800 mg PO Q8H PRN #30 tablet 03/15/18  Unknown Rx














ED Physical Exam





- General


Limitations: No Limitations


General appearance: alert, in no apparent distress, anxious





- Head


Head exam: Present: atraumatic, normocephalic, normal inspection





- Eye


Eye exam: Present: normal appearance, PERRL





- ENT


ENT exam: Present: normal exam, normal orophraynx, mucous membranes moist





- Neck


Neck exam: Present: normal inspection, full ROM.  Absent: tenderness, 

meningismus, lymphadenopathy





- Respiratory


Respiratory exam: Present: normal lung sounds bilaterally.  Absent: respiratory 

distress, wheezes, rales, rhonchi, chest wall tenderness, accessory muscle use, 

decreased breath sounds, prolonged expiratory





- Cardiovascular


Cardiovascular Exam: Present: regular rate, normal rhythm, normal heart sounds





- GI/Abdominal


GI/Abdominal exam: Present: soft, normal bowel sounds.  Absent: distended, 

tenderness, guarding, rebound, rigid, organomegaly, mass, bruit, pulsatile mass





- Extremities Exam


Extremities exam: Present: normal inspection, full ROM, normal capillary refill





- Back Exam


Back exam: Present: normal inspection, full ROM.  Absent: CVA tenderness (R), 

CVA tenderness (L)





- Neurological Exam


Neurological exam: Present: alert, oriented X3, CN II-XII intact, normal gait





- Skin


Skin exam: Present: warm, intact, normal color





ED Course





 Vital Signs











  03/18/18





  18:23


 


Temperature 98.7 F


 


Pulse Rate 88


 


Respiratory 18





Rate 


 


Blood Pressure 123/76


 


O2 Sat by Pulse 98





Oximetry 














ED Medical Decision Making





- Lab Data


Result diagrams: 


 03/18/18 18:30





 03/18/18 18:30


Critical care attestation.: 


If time is entered above; I have spent that time in minutes in the direct care 

of this critically ill patient, excluding procedure time.








ED Disposition


Clinical Impression: 


 Suicidal thoughts





Disposition: DC/TX-65 PSY HOSP/PSY UNIT


Is pt being admited?: No


Condition: Stable


Referrals: 


TOBI FAYE MD [Primary Care Provider] - 3-5 Days

## 2018-03-19 VITALS — DIASTOLIC BLOOD PRESSURE: 70 MMHG | SYSTOLIC BLOOD PRESSURE: 113 MMHG

## 2018-03-19 NOTE — CONSULTATION
History of Present Illness





- Reason for Consult


Consult date: 03/19/18


Reason for consult: Mental Health Evaluation


Requesting physician: MICHEAL VICTOR





- Chief Complaint


Chief complaint: 


"I am suicidal"








- History of Present Psychiatric Illness


47 years old female with a history of hypertension brought to the ER by her 

 for SI's. Today the patient is calm, but vague during the assessment. 

She is adamant about being suicidal because of family issues. She would not 

confirm or deny a suicide plan when asked. She did state that she was a patient 

at [facility] recently. She denies HI's and AVH's. She denies recreational drug 

use and alcohol consumption (etoh). 








Medications and Allergies


 Allergies











Allergy/AdvReac Type Severity Reaction Status Date / Time


 


baclofen Allergy  Unknown Verified 05/14/17 19:01


 


hydromorphone HCl Allergy  Unknown Verified 05/14/17 19:01





[From Dilaudid]     


 


prochlorperazine Allergy  Unknown Verified 05/14/17 19:01





[From Compazine]     


 


prochlorperazine edisylate Allergy  Unknown Verified 05/14/17 19:01





[From Compazine]     


 


prochlorperazine maleate Allergy  Unknown Verified 05/14/17 19:01





[From Compazine]     


 


sulfamethoxazole Allergy  Unknown Verified 05/14/17 19:01





[From Bactrim]     


 


trimethoprim [From Bactrim] Allergy  Unknown Verified 05/14/17 19:01











 Home Medications











 Medication  Instructions  Recorded  Confirmed  Last Taken  Type


 


Butalb/Acetamin/Caff -40 1 tab PO Q6HR PRN #10 tab 05/15/17 03/19/18 

Unknown Rx





[Fioricet]     


 


Oxymetazoline 0.05% [Afrin] 2 spray NS BID #1 bottle 05/15/17 03/19/18 Unknown 

Rx


 


Diphenoxylate/Atropine [Lomotil] 1 tab PO Q4H PRN #14 tablet 05/27/17 03/19/18 

Unknown Rx


 


HYDROcodone/APAP 5-325 [[place] 1 each PO Q6HR PRN #20 tablet 05/27/17 03/19/18 

Unknown Rx





5/325]     


 


Ibuprofen 800 mg PO Q8H PRN #30 tablet 03/15/18 03/19/18 Unknown Rx


 


Gabapentin [Neurontin] 600 mg PO TID 03/19/18 03/19/18 Unknown History


 


QUEtiapine [SEROquel] 600 mg PO QHS 03/19/18 03/19/18 Unknown History


 


Quetiapine Fumarate [SEROquel] 50 mg PO BID 03/19/18 03/19/18 Unknown History


 


clonazePAM [KlonoPIN] 0.25 mg PO QAM 03/19/18 03/19/18 Unknown History


 


clonazePAM [KlonoPIN] 0.5 mg PO QHS 03/19/18 03/19/18 Unknown History














Past psychiatric history





- Past Medical History


Past Medical History: GERD, hypertension


Past Surgical History: No surgical history





- past Psychiatric treatment and history


Psych: Bipolar, Depression


psychiatric treatment history: 


Multiple inpatient psy settings. Denies a fam psy hx. 








- Social History


Social history: lives with family





Mental Status Exam





- Vital signs


 Last Vital Signs











Temp  97.8 F   03/18/18 22:17


 


Pulse  77   03/18/18 22:17


 


Resp  18   03/18/18 22:17


 


BP  113/70   03/18/18 23:46


 


Pulse Ox  100   03/18/18 23:46














- Exam


Narrative exam: 


MSE:





 Appearance: calm, cooperative


 Behavior: regular eye contact 


 Speech: regular rate and tone


 Mood: "depressed" 


 Affect: congruent to mood


 Thought Process: circumstantial  


 Thought Content: denies HI's and AVH's


 Motor Activity: sitting up in bed


 Cognition: A/O x3 


 Insight: variable 


 Judgment: variable 








Results


Result Diagrams: 


 03/18/18 18:30





 03/18/18 18:30


 Abnormal lab results











  03/18/18 03/18/18 03/18/18 Range/Units





  18:30 18:30 18:30 


 


WBC     (4.5-11.0)  K/mm3


 


RBC     (3.65-5.03)  M/mm3


 


Mono #     (0.0-0.8)  K/mm3


 


Seg Neutrophils #     (1.8-7.7)  K/mm3


 


Carbon Dioxide    19 L  (22-30)  mmol/L


 


Calcium    8.2 L  (8.4-10.2)  mg/dL


 


Urine WBC (Auto)     (0.0-6.0)  /HPF


 


Salicylates  < 0.3 L    (2.8-20.0)  mg/dL


 


Acetaminophen   < 5.0 L   (10.0-30.0)  ug/mL














  03/18/18 03/18/18 Range/Units





  18:30 18:49 


 


WBC  12.9 H   (4.5-11.0)  K/mm3


 


RBC  3.62 L   (3.65-5.03)  M/mm3


 


Mono #  0.9 H   (0.0-0.8)  K/mm3


 


Seg Neutrophils #  8.7 H   (1.8-7.7)  K/mm3


 


Carbon Dioxide    (22-30)  mmol/L


 


Calcium    (8.4-10.2)  mg/dL


 


Urine WBC (Auto)   18.0 H  (0.0-6.0)  /HPF


 


Salicylates    (2.8-20.0)  mg/dL


 


Acetaminophen    (10.0-30.0)  ug/mL








All other labs normal.








Assessment and Plan


Assessment and plan: 


Impression: Unspecified Mood DO. Today the patient is calm, but vague during 

the assessment. The positive for benzos and opiates.





DDx: R/O Bipolar DO, R/O MDD





Recommendation/Plan: Continue 1013 with placement to [facility] today.

## 2018-04-04 ENCOUNTER — HOSPITAL ENCOUNTER (EMERGENCY)
Dept: HOSPITAL 5 - ED | Age: 48
Discharge: HOME | End: 2018-04-04
Payer: MEDICARE

## 2018-04-04 VITALS — SYSTOLIC BLOOD PRESSURE: 165 MMHG | DIASTOLIC BLOOD PRESSURE: 95 MMHG

## 2018-04-04 DIAGNOSIS — N39.0: ICD-10-CM

## 2018-04-04 DIAGNOSIS — G43.909: ICD-10-CM

## 2018-04-04 DIAGNOSIS — I10: ICD-10-CM

## 2018-04-04 DIAGNOSIS — F17.200: ICD-10-CM

## 2018-04-04 DIAGNOSIS — Z88.8: ICD-10-CM

## 2018-04-04 DIAGNOSIS — F31.9: Primary | ICD-10-CM

## 2018-04-04 DIAGNOSIS — K21.9: ICD-10-CM

## 2018-04-04 DIAGNOSIS — Z88.2: ICD-10-CM

## 2018-04-04 LAB
BASOPHILS # (AUTO): 0 K/MM3 (ref 0–0.1)
BASOPHILS NFR BLD AUTO: 0.4 % (ref 0–1.8)
BENZODIAZEPINES SCREEN,URINE: (no result)
BILIRUB UR QL STRIP: (no result)
BLOOD UR QL VISUAL: (no result)
BUN SERPL-MCNC: 11 MG/DL (ref 7–17)
BUN/CREAT SERPL: 14 %
CALCIUM SERPL-MCNC: 9.2 MG/DL (ref 8.4–10.2)
EOSINOPHIL # BLD AUTO: 0.4 K/MM3 (ref 0–0.4)
EOSINOPHIL NFR BLD AUTO: 4.4 % (ref 0–4.3)
HCT VFR BLD CALC: 34.1 % (ref 30.3–42.9)
HEMOLYSIS INDEX: 6
HGB BLD-MCNC: 11.5 GM/DL (ref 10.1–14.3)
LYMPHOCYTES # BLD AUTO: 1.9 K/MM3 (ref 1.2–5.4)
LYMPHOCYTES NFR BLD AUTO: 24 % (ref 13.4–35)
MCH RBC QN AUTO: 30 PG (ref 28–32)
MCHC RBC AUTO-ENTMCNC: 34 % (ref 30–34)
MCV RBC AUTO: 88 FL (ref 79–97)
METHADONE SCREEN,URINE: (no result)
MONOCYTES # (AUTO): 0.5 K/MM3 (ref 0–0.8)
MONOCYTES % (AUTO): 5.8 % (ref 0–7.3)
MUCOUS THREADS #/AREA URNS HPF: (no result) /HPF
OPIATE SCREEN,URINE: (no result)
PH UR STRIP: 6 [PH] (ref 5–7)
PLATELET # BLD: 234 K/MM3 (ref 140–440)
PROT UR STRIP-MCNC: (no result) MG/DL
RBC # BLD AUTO: 3.88 M/MM3 (ref 3.65–5.03)
RBC #/AREA URNS HPF: 3 /HPF (ref 0–6)
UROBILINOGEN UR-MCNC: < 2 MG/DL (ref ?–2)
WBC #/AREA URNS HPF: 8 /HPF (ref 0–6)

## 2018-04-04 PROCEDURE — 85025 COMPLETE CBC W/AUTO DIFF WBC: CPT

## 2018-04-04 PROCEDURE — 80307 DRUG TEST PRSMV CHEM ANLYZR: CPT

## 2018-04-04 PROCEDURE — G0480 DRUG TEST DEF 1-7 CLASSES: HCPCS

## 2018-04-04 PROCEDURE — 80048 BASIC METABOLIC PNL TOTAL CA: CPT

## 2018-04-04 PROCEDURE — 80320 DRUG SCREEN QUANTALCOHOLS: CPT

## 2018-04-04 PROCEDURE — 81001 URINALYSIS AUTO W/SCOPE: CPT

## 2018-04-04 PROCEDURE — 36415 COLL VENOUS BLD VENIPUNCTURE: CPT

## 2018-04-04 PROCEDURE — 99283 EMERGENCY DEPT VISIT LOW MDM: CPT

## 2018-04-04 NOTE — EMERGENCY DEPARTMENT REPORT
ED Psych HPI





- General


Chief Complaint: Psych


Stated Complaint: MH


Time Seen by Provider: 04/04/18 16:34


Source: patient


Mode of arrival: Ambulatory





- History of Present Illness


Initial Comments: 





Patient is a 47-year-old  female states for the last several days she'

s had suicidal ideations.  Patient states she wants to shoot herself in the 

head with a gun.  There is a gun in the home.  Patient states she is very 

anxious as well.  Patient presented in May for further evaluation.  Patient is 

placed on 1013.


MD Complaint: suicidal ideation, feels depressed


Associated Psychiatric Symptoms: depression, suicidal ideation


Improves With: none


Worsens With: none


Associated Symptoms: insomnia.  denies: nausea, vomiting, syncope





- Related Data


 Home Medications











 Medication  Instructions  Recorded  Confirmed  Last Taken


 


Gabapentin [Neurontin] 300 mg PO TID 03/19/18 04/04/18 Unknown


 


QUEtiapine [SEROquel] 600 mg PO QHS 03/19/18 04/04/18 Unknown


 


Quetiapine Fumarate [SEROquel] 400 mg PO DAILY 03/19/18 04/04/18 Unknown


 


clonazePAM [KlonoPIN] 0.5 mg PO QHS 03/19/18 04/04/18 Unknown


 


SUMAtriptan SUCCINATE [Imitrex] 100 mg PO DAILY PRN 04/04/18 04/04/18 Unknown


 


busPIRone [Buspar] 40 mg PO DAILY 04/04/18 04/04/18 Unknown








 Previous Rx's











 Medication  Instructions  Recorded  Last Taken  Type


 


Butalb/Acetamin/Caff -40 1 tab PO Q6HR PRN #10 tab 05/15/17 Unknown Rx





[Fioricet]    


 


HYDROcodone/APAP 5-325 [[place] 1 each PO Q6HR PRN #20 tablet 05/27/17 Unknown Rx





5/325]    


 


Nitrofurantoin Monohyd/M-Cryst 100 mg PO BID #14 capsule 04/04/18 Unknown Rx





[Macrobid 100 mg Capsule]    











 Allergies











Allergy/AdvReac Type Severity Reaction Status Date / Time


 


baclofen Allergy  Unknown Verified 05/14/17 19:01


 


hydromorphone HCl Allergy  Unknown Verified 05/14/17 19:01





[From Dilaudid]     


 


prochlorperazine Allergy  Unknown Verified 05/14/17 19:01





[From Compazine]     


 


prochlorperazine edisylate Allergy  Unknown Verified 05/14/17 19:01





[From Compazine]     


 


prochlorperazine maleate Allergy  Unknown Verified 05/14/17 19:01





[From Compazine]     


 


sulfamethoxazole Allergy  Unknown Verified 05/14/17 19:01





[From Bactrim]     


 


trimethoprim [From Bactrim] Allergy  Unknown Verified 05/14/17 19:01














ED Review of Systems


ROS: 


Stated complaint: MH


Other details as noted in HPI





Comment: All other systems reviewed and negative





ED Past Medical Hx





- Past Medical History


Previous Medical History?: Yes


Hx Hypertension: Yes


Hx GERD: Yes


Hx Headaches / Migraines: Yes (MIGRAINES)


Hx Psychiatric Treatment: Yes (bipolar, Suicidal attempt)





- Surgical History


Past Surgical History?: Yes


Additional Surgical History: RIGHT ACL X 4, hysterectomy.  RIGHT WRIST.  LEFT 

KNEE.  LEFT ANKLE.  BACK SURGERY.  HYSTERECTOMY





- Social History


Smoking Status: Current Every Day Smoker





- Medications


Home Medications: 


 Home Medications











 Medication  Instructions  Recorded  Confirmed  Last Taken  Type


 


Butalb/Acetamin/Caff -40 1 tab PO Q6HR PRN #10 tab 05/15/17 04/04/18 

Unknown Rx





[Fioricet]     


 


HYDROcodone/APAP 5-325 [[place] 1 each PO Q6HR PRN #20 tablet 05/27/17 04/04/18 

Unknown Rx





5/325]     


 


Gabapentin [Neurontin] 300 mg PO TID 03/19/18 04/04/18 Unknown History


 


QUEtiapine [SEROquel] 600 mg PO QHS 03/19/18 04/04/18 Unknown History


 


Quetiapine Fumarate [SEROquel] 400 mg PO DAILY 03/19/18 04/04/18 Unknown History


 


clonazePAM [KlonoPIN] 0.5 mg PO QHS 03/19/18 04/04/18 Unknown History


 


Nitrofurantoin Monohyd/M-Cryst 100 mg PO BID #14 capsule 04/04/18  Unknown Rx





[Macrobid 100 mg Capsule]     


 


SUMAtriptan SUCCINATE [Imitrex] 100 mg PO DAILY PRN 04/04/18 04/04/18 Unknown 

History


 


busPIRone [Buspar] 40 mg PO DAILY 04/04/18 04/04/18 Unknown History














ED Physical Exam





- General


Limitations: No Limitations


General appearance: alert, in no apparent distress





- Head


Head exam: Present: atraumatic, normocephalic





- Eye


Eye exam: Present: normal appearance





- ENT


ENT exam: Present: mucous membranes moist





- Neck


Neck exam: Present: normal inspection





- Respiratory


Respiratory exam: Present: normal lung sounds bilaterally.  Absent: respiratory 

distress, wheezes, rales, rhonchi





- Cardiovascular


Cardiovascular Exam: Present: regular rate, normal rhythm.  Absent: systolic 

murmur, diastolic murmur, rubs, gallop





- GI/Abdominal


GI/Abdominal exam: Present: soft, normal bowel sounds.  Absent: distended, 

tenderness, guarding, rebound





- Extremities Exam


Extremities exam: Present: normal inspection





- Back Exam


Back exam: Present: normal inspection





- Neurological Exam


Neurological exam: Present: alert, oriented X3





- Psychiatric


Psychiatric exam: Present: normal affect, normal mood





- Skin


Skin exam: Present: warm, dry, intact, normal color.  Absent: rash





ED Course


 Vital Signs











  04/04/18





  12:29


 


Temperature 97.8 F


 


Pulse Rate 76


 


Respiratory 18





Rate 


 


Blood Pressure 165/95


 


O2 Sat by Pulse 98





Oximetry 














ED Medical Decision Making





- Lab Data


Result diagrams: 


 04/04/18 13:02





 04/04/18 13:02





- Medical Decision Making





Patient is medically clear for psych evaluation








Addendum: Patient was seen by mental health  and we have found out that 

the [facility].  Patient left the last day.  

[facility]  [facility] back.  Patient will be treated for UTI with 

Macrobid.


Critical care attestation.: 


If time is entered above; I have spent that time in minutes in the direct care 

of this critically ill patient, excluding procedure time.








ED Disposition


Clinical Impression: 


 Suicidal ideation





UTI (urinary tract infection)


Qualifiers:


 Urinary tract infection type: site unspecified Hematuria presence: without 

hematuria Qualified Code(s): N39.0 - Urinary tract infection, site not specified





Disposition: DC-01 TO HOME OR SELFCARE


Is pt being admited?: No


Does the pt Need Aspirin: No


Condition: Stable


Prescriptions: 


Nitrofurantoin Monohyd/M-Cryst [Macrobid 100 mg Capsule] 100 mg PO BID #14 

capsule


Referrals: 


TOBI FAYE MD [Primary Care Provider] - 3-5 Days

## 2018-04-04 NOTE — EMERGENCY DEPARTMENT REPORT
Blank Doc





- Documentation


Documentation: 





Patient is a 47-year-old  female states for the last several days she'

s had suicidal ideations.  Patient states she wants to shoot herself in the 

head with a gun.  There is a gun in the home.  Patient states she is very 

anxious as well.  Patient presented in May for further evaluation.  Patient is 

placed on 1013.

## 2018-04-05 ENCOUNTER — HOSPITAL ENCOUNTER (EMERGENCY)
Dept: HOSPITAL 5 - ED | Age: 48
Discharge: LEFT BEFORE DISCHARGE | End: 2018-04-05
Payer: MEDICARE

## 2018-04-05 VITALS — SYSTOLIC BLOOD PRESSURE: 129 MMHG | DIASTOLIC BLOOD PRESSURE: 69 MMHG

## 2018-04-05 DIAGNOSIS — Z53.21: ICD-10-CM

## 2018-04-05 DIAGNOSIS — R45.851: Primary | ICD-10-CM

## 2018-04-06 ENCOUNTER — HOSPITAL ENCOUNTER (EMERGENCY)
Dept: HOSPITAL 5 - ED | Age: 48
Discharge: HOME | End: 2018-04-06
Payer: MEDICARE

## 2018-04-06 VITALS — DIASTOLIC BLOOD PRESSURE: 78 MMHG | SYSTOLIC BLOOD PRESSURE: 126 MMHG

## 2018-04-06 DIAGNOSIS — F17.200: ICD-10-CM

## 2018-04-06 DIAGNOSIS — K21.9: ICD-10-CM

## 2018-04-06 DIAGNOSIS — I10: ICD-10-CM

## 2018-04-06 DIAGNOSIS — Z88.8: ICD-10-CM

## 2018-04-06 DIAGNOSIS — S73.102A: Primary | ICD-10-CM

## 2018-04-06 DIAGNOSIS — Z90.710: ICD-10-CM
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
## 2018-04-06 DIAGNOSIS — F31.9: ICD-10-CM

## 2018-04-06 DIAGNOSIS — Z88.2: ICD-10-CM
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
## 2018-04-06 LAB
ALBUMIN SERPL-MCNC: 4.3 G/DL (ref 3.9–5)
ALT SERPL-CCNC: 13 UNITS/L (ref 7–56)
BASOPHILS # (AUTO): 0.1 K/MM3 (ref 0–0.1)
BASOPHILS NFR BLD AUTO: 0.8 % (ref 0–1.8)
BUN SERPL-MCNC: 9 MG/DL (ref 7–17)
BUN/CREAT SERPL: 13 %
CALCIUM SERPL-MCNC: 9 MG/DL (ref 8.4–10.2)
EOSINOPHIL # BLD AUTO: 0.5 K/MM3 (ref 0–0.4)
EOSINOPHIL NFR BLD AUTO: 5.5 % (ref 0–4.3)
HCT VFR BLD CALC: 34.6 % (ref 30.3–42.9)
HEMOLYSIS INDEX: 37
HGB BLD-MCNC: 11.4 GM/DL (ref 10.1–14.3)
LYMPHOCYTES # BLD AUTO: 1.9 K/MM3 (ref 1.2–5.4)
LYMPHOCYTES NFR BLD AUTO: 20.3 % (ref 13.4–35)
MCH RBC QN AUTO: 29 PG (ref 28–32)
MCHC RBC AUTO-ENTMCNC: 33 % (ref 30–34)
MCV RBC AUTO: 89 FL (ref 79–97)
MONOCYTES # (AUTO): 0.7 K/MM3 (ref 0–0.8)
MONOCYTES % (AUTO): 7.6 % (ref 0–7.3)
PLATELET # BLD: 230 K/MM3 (ref 140–440)
RBC # BLD AUTO: 3.89 M/MM3 (ref 3.65–5.03)

## 2018-04-06 PROCEDURE — 85025 COMPLETE CBC W/AUTO DIFF WBC: CPT

## 2018-04-06 PROCEDURE — 36415 COLL VENOUS BLD VENIPUNCTURE: CPT

## 2018-04-06 PROCEDURE — 80053 COMPREHEN METABOLIC PANEL: CPT

## 2018-04-06 NOTE — EMERGENCY DEPARTMENT REPORT
ED Lower Extremity HPI





- General


Chief Complaint: Fall


Stated Complaint: LEFT HIP PAIN/FALL GLF


Time Seen by Provider: 04/06/18 19:13


Source: patient, EMS


Mode of arrival: Stretcher


Limitations: Physical Limitation





- History of Present Illness


MD Complaint: hip injury (left)


-: This afternoon


Injury: Hip: Left


Type of Injury: hyperextension


Severity: moderate


Severity scale (0 -10): 5


Improves With: nothing


Worsens With: movement


Context: fall


Associated Symptoms: snap/pop sensation.  denies: ambulatory





- Related Data


 Home Medications











 Medication  Instructions  Recorded  Confirmed  Last Taken


 


Gabapentin [Neurontin] 300 mg PO TID 03/19/18 04/04/18 Unknown


 


QUEtiapine [SEROquel] 600 mg PO QHS 03/19/18 04/04/18 Unknown


 


Quetiapine Fumarate [SEROquel] 400 mg PO DAILY 03/19/18 04/04/18 Unknown


 


clonazePAM [KlonoPIN] 0.5 mg PO QHS 03/19/18 04/04/18 Unknown


 


SUMAtriptan SUCCINATE [Imitrex] 100 mg PO DAILY PRN 04/04/18 04/04/18 Unknown


 


busPIRone [Buspar] 40 mg PO DAILY 04/04/18 04/04/18 Unknown








 Previous Rx's











 Medication  Instructions  Recorded  Last Taken  Type


 


Butalb/Acetamin/Caff -40 1 tab PO Q6HR PRN #10 tab 05/15/17 Unknown Rx





[Fioricet]    


 


Nitrofurantoin Monohyd/M-Cryst 100 mg PO BID #14 capsule 04/04/18 Unknown Rx





[Macrobid 100 mg Capsule]    


 


HYDROcodone/APAP 5-325 [[place] 1 each PO Q6HR PRN #14 tablet 04/06/18 Unknown Rx





5-325 mg TAB]    


 


Ibuprofen [Motrin] 600 mg PO Q8H PRN #20 tablet 04/06/18 Unknown Rx











 Allergies











Allergy/AdvReac Type Severity Reaction Status Date / Time


 


baclofen Allergy  Unknown Verified 05/14/17 19:01


 


hydromorphone HCl Allergy  Unknown Verified 05/14/17 19:01





[From Dilaudid]     


 


prochlorperazine Allergy  Unknown Verified 05/14/17 19:01





[From Compazine]     


 


prochlorperazine edisylate Allergy  Unknown Verified 05/14/17 19:01





[From Compazine]     


 


prochlorperazine maleate Allergy  Unknown Verified 05/14/17 19:01





[From Compazine]     


 


sulfamethoxazole Allergy  Unknown Verified 05/14/17 19:01





[From Bactrim]     


 


trimethoprim [From Bactrim] Allergy  Unknown Verified 05/14/17 19:01














ED Review of Systems


ROS: 


Stated complaint: LEFT HIP PAIN/FALL GLF


Other details as noted in HPI





Comment: All other systems reviewed and negative





ED Past Medical Hx





- Past Medical History


Previous Medical History?: Yes


Hx Hypertension: Yes


Hx GERD: Yes


Hx Headaches / Migraines: Yes (MIGRAINES)


Hx Psychiatric Treatment: Yes (bipolar, Suicidal attempt)





- Surgical History


Past Surgical History?: Yes


Additional Surgical History: RIGHT ACL X 4, hysterectomy.  RIGHT WRIST.  LEFT 

KNEE.  LEFT ANKLE.  BACK SURGERY.  HYSTERECTOMY





- Social History


Smoking Status: Current Every Day Smoker





- Medications


Home Medications: 


 Home Medications











 Medication  Instructions  Recorded  Confirmed  Last Taken  Type


 


Butalb/Acetamin/Caff -40 1 tab PO Q6HR PRN #10 tab 05/15/17 04/04/18 

Unknown Rx





[Fioricet]     


 


Gabapentin [Neurontin] 300 mg PO TID 03/19/18 04/04/18 Unknown History


 


QUEtiapine [SEROquel] 600 mg PO QHS 03/19/18 04/04/18 Unknown History


 


Quetiapine Fumarate [SEROquel] 400 mg PO DAILY 03/19/18 04/04/18 Unknown History


 


clonazePAM [KlonoPIN] 0.5 mg PO QHS 03/19/18 04/04/18 Unknown History


 


Nitrofurantoin Monohyd/M-Cryst 100 mg PO BID #14 capsule 04/04/18  Unknown Rx





[Macrobid 100 mg Capsule]     


 


SUMAtriptan SUCCINATE [Imitrex] 100 mg PO DAILY PRN 04/04/18 04/04/18 Unknown 

History


 


busPIRone [Buspar] 40 mg PO DAILY 04/04/18 04/04/18 Unknown History


 


HYDROcodone/APAP 5-325 [[place] 1 each PO Q6HR PRN #14 tablet 04/06/18  Unknown Rx





5-325 mg TAB]     


 


Ibuprofen [Motrin] 600 mg PO Q8H PRN #20 tablet 04/06/18  Unknown Rx














ED Physical Exam





- General


Limitations: Physical Limitation


General appearance: alert, in no apparent distress





- Head


Head exam: Present: atraumatic, normocephalic





- Eye


Eye exam: Present: normal appearance





- ENT


ENT exam: Present: mucous membranes moist





- Neck


Neck exam: Present: normal inspection





- Respiratory


Respiratory exam: Present: normal lung sounds bilaterally.  Absent: respiratory 

distress, wheezes, rales





- Cardiovascular


Cardiovascular Exam: Present: regular rate, normal rhythm.  Absent: systolic 

murmur, diastolic murmur, rubs, gallop





- GI/Abdominal


GI/Abdominal exam: Present: soft, normal bowel sounds.  Absent: distended, 

tenderness, guarding, rebound





- Extremities Exam


Extremities exam: Present: tenderness (left him, held in flexion)





- Back Exam


Back exam: Present: normal inspection





- Neurological Exam


Neurological exam: Present: alert, oriented X3





- Psychiatric


Psychiatric exam: Present: normal affect, normal mood





- Skin


Skin exam: Present: warm, dry, intact, normal color.  Absent: rash





ED Course





 Vital Signs











  04/06/18 04/06/18





  19:08 22:02


 


Temperature 97.9 F 99 F


 


Pulse Rate 78 74


 


Respiratory 18 16





Rate  


 


Blood Pressure 142/77 


 


Blood Pressure  126/78





[Right]  


 


O2 Sat by Pulse 99 98





Oximetry  














ED Lower Extremity MDM





- Lab Data


Result diagrams: 


 04/06/18 20:17





 04/06/18 19:36





- Radiology Data


Radiology results: report reviewed





no fracture left hip





Critical care attestation.: 


If time is entered above; I have spent that time in minutes in the direct care 

of this critically ill patient, excluding procedure time.








ED Disposition


Clinical Impression: 


Hip sprain


Qualifiers:


 Encounter type: initial encounter Laterality: left Qualified Code(s): S73.102A 

- Unspecified sprain of left hip, initial encounter





Disposition: DC-01 TO HOME OR SELFCARE


Is pt being admited?: No


Does the pt Need Aspirin: No


Condition: Stable


Instructions:  Hip Sprain (ED)


Prescriptions: 


HYDROcodone/APAP 5-325 [[place] 5-325 mg TAB] 1 each PO Q6HR PRN #14 tablet


 PRN Reason: Pain


Ibuprofen [Motrin] 600 mg PO Q8H PRN #20 tablet


 PRN Reason: Pain


Referrals: 


MARIE WATSON MD [Staff Physician] - as needed

## 2023-01-12 NOTE — XRAY REPORT
FINAL REPORT



EXAM:  XR HIP 2-3V LT



HISTORY:  post fall injury, left hip pain 



TECHNIQUE:  Left hip and AP pelvis 



PRIORS:  None.



FINDINGS:  

No fracture identified. No dislocation seen. Femoral head

maintains a normal contour.  Joint spaces within normal limits. 

Adjacent bony pelvis is unremarkable. There is a lumbar fusion

device noted at S1 



IMPRESSION:  

Negative hip series No

## 2023-07-18 NOTE — CAT SCAN REPORT
FINAL REPORT



PROCEDURE:  CT ABDOMEN PELVIS W CON



TECHNIQUE:  Computerized axial tomography of the abdomen and

pelvis was performed after the IV injection of iodinated nonionic

contrast. 



HISTORY:  lower abd pain with rectal bleeding 



COMPARISON:  No prior studies are available for comparison.



FINDINGS:  

There are mild degree bilateral pleural effusions. Liver, spleen,

pancreas and adrenal glands are within normal limits. Bilateral

kidneys are free of hydronephrosis. Right kidney demonstrates

uniform enhancement. Left kidney demonstrates a well defined

cystic lesion measuring 2.0 centimeters in the midpole consistent

with a simple cyst. Aorta is of normal caliber. There is no free

fluid or free air. Status post cholecystectomy. Small bowel loops

are within normal limits. Appendix is not distinctly visualized.

There are no inflammatory changes in the right lower quadrant. A

small uncomplicated fat containing umbilical hernia is noted.

Mild degree wedge compression deformity is noted involving L1

vertebral body. Surgical fusion is identified at L5-S1 



IMPRESSION:  

No acute intra-abdominal or pelvic pathology.



Mild wedge compression deformity of L1 vertebral body is noted.

The age of this fracture is not known. PAST MEDICAL HISTORY:  Essential hypertension      (normal spontaneous vaginal delivery)